# Patient Record
Sex: MALE | Race: WHITE | NOT HISPANIC OR LATINO | Employment: OTHER | URBAN - METROPOLITAN AREA
[De-identification: names, ages, dates, MRNs, and addresses within clinical notes are randomized per-mention and may not be internally consistent; named-entity substitution may affect disease eponyms.]

---

## 2023-01-28 ENCOUNTER — HOSPITAL ENCOUNTER (EMERGENCY)
Facility: MEDICAL CENTER | Age: 48
End: 2023-01-28
Attending: EMERGENCY MEDICINE
Payer: COMMERCIAL

## 2023-01-28 VITALS
BODY MASS INDEX: 26.7 KG/M2 | TEMPERATURE: 98 F | RESPIRATION RATE: 14 BRPM | WEIGHT: 190.7 LBS | HEART RATE: 60 BPM | DIASTOLIC BLOOD PRESSURE: 108 MMHG | HEIGHT: 71 IN | SYSTOLIC BLOOD PRESSURE: 179 MMHG | OXYGEN SATURATION: 96 %

## 2023-01-28 DIAGNOSIS — M54.2 NECK PAIN: ICD-10-CM

## 2023-01-28 PROCEDURE — 700111 HCHG RX REV CODE 636 W/ 250 OVERRIDE (IP)

## 2023-01-28 PROCEDURE — 96372 THER/PROPH/DIAG INJ SC/IM: CPT

## 2023-01-28 PROCEDURE — 96365 THER/PROPH/DIAG IV INF INIT: CPT

## 2023-01-28 PROCEDURE — 700111 HCHG RX REV CODE 636 W/ 250 OVERRIDE (IP): Performed by: EMERGENCY MEDICINE

## 2023-01-28 PROCEDURE — 700105 HCHG RX REV CODE 258: Performed by: EMERGENCY MEDICINE

## 2023-01-28 PROCEDURE — 99284 EMERGENCY DEPT VISIT MOD MDM: CPT

## 2023-01-28 PROCEDURE — 96375 TX/PRO/DX INJ NEW DRUG ADDON: CPT

## 2023-01-28 RX ORDER — KETOROLAC TROMETHAMINE 30 MG/ML
60 INJECTION, SOLUTION INTRAMUSCULAR; INTRAVENOUS ONCE
Status: COMPLETED | OUTPATIENT
Start: 2023-01-28 | End: 2023-01-28

## 2023-01-28 RX ORDER — PREDNISONE 20 MG/1
60 TABLET ORAL ONCE
Status: COMPLETED | OUTPATIENT
Start: 2023-01-28 | End: 2023-01-28

## 2023-01-28 RX ORDER — HYDROMORPHONE HYDROCHLORIDE 2 MG/ML
2 INJECTION, SOLUTION INTRAMUSCULAR; INTRAVENOUS; SUBCUTANEOUS ONCE
Status: COMPLETED | OUTPATIENT
Start: 2023-01-28 | End: 2023-01-28

## 2023-01-28 RX ORDER — HYDROMORPHONE HYDROCHLORIDE 2 MG/ML
INJECTION, SOLUTION INTRAMUSCULAR; INTRAVENOUS; SUBCUTANEOUS
Status: COMPLETED
Start: 2023-01-28 | End: 2023-01-28

## 2023-01-28 RX ORDER — HYDROMORPHONE HYDROCHLORIDE 1 MG/ML
0.5 INJECTION, SOLUTION INTRAMUSCULAR; INTRAVENOUS; SUBCUTANEOUS ONCE
Status: COMPLETED | OUTPATIENT
Start: 2023-01-28 | End: 2023-01-28

## 2023-01-28 RX ORDER — KETOROLAC TROMETHAMINE 30 MG/ML
INJECTION, SOLUTION INTRAMUSCULAR; INTRAVENOUS
Status: COMPLETED
Start: 2023-01-28 | End: 2023-01-28

## 2023-01-28 RX ADMIN — HYDROMORPHONE HYDROCHLORIDE 2 MG: 2 INJECTION INTRAMUSCULAR; INTRAVENOUS; SUBCUTANEOUS at 15:45

## 2023-01-28 RX ADMIN — METHOCARBAMOL 1000 MG: 100 INJECTION INTRAMUSCULAR; INTRAVENOUS at 16:54

## 2023-01-28 RX ADMIN — KETOROLAC TROMETHAMINE 60 MG: 30 INJECTION, SOLUTION INTRAMUSCULAR; INTRAVENOUS at 15:45

## 2023-01-28 RX ADMIN — PREDNISONE 60 MG: 20 TABLET ORAL at 15:27

## 2023-01-28 RX ADMIN — KETOROLAC TROMETHAMINE 60 MG: 30 INJECTION, SOLUTION INTRAMUSCULAR at 15:45

## 2023-01-28 RX ADMIN — HYDROMORPHONE HYDROCHLORIDE 0.5 MG: 1 INJECTION, SOLUTION INTRAMUSCULAR; INTRAVENOUS; SUBCUTANEOUS at 16:51

## 2023-01-28 RX ADMIN — KETOROLAC TROMETHAMINE 60 MG: 30 INJECTION, SOLUTION INTRAMUSCULAR; INTRAVENOUS at 15:27

## 2023-01-28 ASSESSMENT — PAIN DESCRIPTION - PAIN TYPE: TYPE: ACUTE PAIN;CHRONIC PAIN

## 2023-01-28 ASSESSMENT — LIFESTYLE VARIABLES: DO YOU DRINK ALCOHOL: NO

## 2023-01-28 NOTE — ED PROVIDER NOTES
"  ER Provider Note    Scribed for Mikel Whipple D.O. by Travis Horne. 1/28/2023  3:07 PM    Primary Care Provider: Pcp Pt States None    CHIEF COMPLAINT  Chief Complaint   Patient presents with    Neck Pain     Patient reports neck pain since 12/22 patient denies any injury. Patient had an MRI in [place] yesterday. Patient reports visiting [facility] and pain has increased last night into today. Patient reports taking oxycodone at 1220 today. Patient reports pain as sharp burning pain to left arm 8/10.      LIMITATION TO HISTORY   Select: : None    HPI/ROS  OUTSIDE HISTORIAN(S):  None    EXTERNAL RECORDS REVIEWED  Patient read off the initial impression of his neck MRI that was given to him by his primary doctor that showed bulging discs vs. herniation.    Barron Strong is a 48 y.o. male who presents to the ED for acute, mild to moderate neck pain onset 12/22/2022. The patient has been experiencing neck pain since December but denies any injury. He had an MRI in [place] yesterday but has not had results back. His primary care informed him he may have herniated discs. He recently went to ski in [facility] and says his pain has been worsening since last night after hitting his head. Denies weakness, numbness, or tingling. He is able to move his extremities normally. He took oxycodone today at 12:30 PM with no alleviation.    PAST MEDICAL HISTORY  History reviewed. No pertinent past medical history.    SURGICAL HISTORY  History reviewed. No pertinent surgical history.    FAMILY HISTORY  History reviewed. No pertinent family history.    SOCIAL HISTORY   reports that he has never smoked. He has never used smokeless tobacco. He reports that he does not currently use alcohol. He reports current drug use. Drug: Oral.    CURRENT MEDICATIONS  No current outpatient medications    ALLERGIES  Sulfa drugs    PHYSICAL EXAM  BP (!) 184/117   Pulse 76   Temp 36.6 °C (97.8 °F) (Temporal)   Resp 16   Ht 1.803 m (5' 11\")   Wt 86.5 kg (190 " lb 11.2 oz)   SpO2 97%   BMI 26.60 kg/m²   General: Moderate distress due to pain.  HENT: Normocephalic, Mucus membranes are moist.   Chest: Lungs have even and unlabored respirations, Clear to auscultation.   Cardiovascular: Regular rate and rhythm, No peripheral cyanosis.  Abdomen: Non distended.  Neuro: Awake, Conversive, Able to relay recent events. Strength upper and lower extremities normal, Sensation of upper and lower extremities normal.  Psychiatric: Calm and cooperative.   Neck: Neck stiffness, Turns body to look left and right, Tenderness diffusely to cervical spine, Muscle spasms of paraspinal muscles.    COURSE & MEDICAL DECISION MAKING    ED Observation Status? Yes; I am placing the patient in to an observation status due to a diagnostic uncertainty as well as therapeutic intensity. Patient placed in observation status at 3:13 PM, 1/28/2023.     Observation plan is as follows: Will treat for pain and reevaluate and effectiveness of this.    Upon Reevaluation, the patient's condition has: Improved; and will be discharged.    Patient discharged from ED Observation status at 5:34 PM, 1/28/2023.     INITIAL ASSESSMENT AND PLAN  Care Narrative: Patient has chronic neck pain. He is being evaluated in his home region by a neurologist and had MRI done with questionable disc herniation. He is complaining of increase in pain specifically after he bumped his head last night. No neurological deficits subjectively or objectively to upper and lower extremities. I do not suspect spinal cord injury at this time. Will treat with injectable pain medication and reevaluate.    3:07 PM - Patient seen and evaluated at bedside. Discussed plan of care, including pain medication and possible admission for pain control. Patient agrees to plan of care. Patient will be treated with Dilaudid 2 mg injection, Toradol 60 mg, and prednisone 60 mg for his symptoms.    4:26 PM - Patient was reevaluated at bedside. His pain has gone  from a 10 to 8 but is still severe when he tries to move. Will give another dose of medication and try IV. Patient will be treated with Robaxin 1,000 mg and Dilaudid 0.5 mg for his symptoms.    5:34 PM - Patient was reevaluated at bedside and feels much improved at this time. The range of motion of his neck is improved without tenderness. Still no neurological deficits. Will discharge home and use home medication to control pain. Patient is comfortable with discharge.    Emergency Department course: This patient has history of neck pain.  Is had MRI and is being worked up as an outpatient in [place].  He is here with his family and he has increased pain.  He has oxycodone already written for this did not improve his pain.  He did receive parenteral narcotics, anti-inflammatories, and steroid medication and muscle relaxer and his pain did improve.  With this my hope is that he can go home and use his pain medication he would be more effective now that his pain is under better control.    He has no neurological deficit there is no indication for acute MRI as there is no indication for acute surgery or so patient is stable for discharge home and continue follow-up with his physicians at home.    ADDITIONAL PROBLEM LIST AND DISPOSITION  Problem #1: Acute and chronic neck pain    I have discussed management of the patient with the following physicians and CHELSY's: None    Discussion of management with other QHP or appropriate source(s): None     Escalation of care considered, and ultimately not performed: acute inpatient care management, however at this time, the patient is most appropriate for outpatient management.  Because his pain did significantly improve.    Barriers to care at this time, including but not limited to:  [place] .     Decision tools and prescription drugs considered including, but not limited to: Pain Medications   .    The patient will return for new or worsening symptoms and  is stable at the time of discharge.    DISPOSITION:  Patient will be discharged home in stable condition.    FOLLOW UP:    See your primary doctor when you return to [place] for follow-up  In 3 days      FINAL IMPRESSION   1. Neck pain       Travis STONE (Scribe), am scribing for, and in the presence of, Mikel Whipple D.O..    Electronically signed by: Travis Horne (Scribe), 1/28/2023    Mikel STONE D.O. personally performed the services described in this documentation, as scribed by Travis Horne in my presence, and it is both accurate and complete.    The note accurately reflects work and decisions made by me.  Mikel Whipple D.O.  1/28/2023  7:03 PM

## 2023-01-28 NOTE — ED TRIAGE NOTES
Chief Complaint   Patient presents with    Neck Pain     Patient reports neck pain since 12/22 patient denies any injury. Patient had an MRI in [place] yesterday. Patient reports visiting [facility] and pain has increased last night into today. Patient reports taking oxycodone at 1220 today. Patient reports pain as sharp burning pain to left arm 8/10.

## 2023-01-29 NOTE — ED NOTES
Patient was educated on discharge instructions.  Patient was informed about diagnosis, symptom management, risks, and home care instructions.  Patient verbalized understanding and signed discharge instructions. Copy of discharge instructions in chart.  Patient ambulated out with steady gait.  Patient has personal belongings and PIV removed, tip intact.  Pt will uber back to family.

## 2023-03-15 NOTE — DISCHARGE INSTRUCTIONS
You been given steroids, pain medication, and muscle relaxers this is improving.  The steroids take a good 24 hours to take effect.  You are being discharged home to continue your narcotics previously prescribed, and that we will give this time for the steroids to also work and hopefully we can prevent admission.    Is here pain is improved admission is not necessary at this time and he would probably a lot more comfortable at home rather than in the hospital.  Return to the emergency room if pain becomes severe again   no